# Patient Record
Sex: MALE | Race: WHITE | URBAN - METROPOLITAN AREA
[De-identification: names, ages, dates, MRNs, and addresses within clinical notes are randomized per-mention and may not be internally consistent; named-entity substitution may affect disease eponyms.]

---

## 2017-02-27 ENCOUNTER — OFFICE VISIT (OUTPATIENT)
Dept: FAMILY MEDICINE | Facility: CLINIC | Age: 27
End: 2017-02-27
Payer: COMMERCIAL

## 2017-02-27 VITALS
DIASTOLIC BLOOD PRESSURE: 94 MMHG | OXYGEN SATURATION: 95 % | HEART RATE: 112 BPM | SYSTOLIC BLOOD PRESSURE: 146 MMHG | BODY MASS INDEX: 40.61 KG/M2 | WEIGHT: 251.6 LBS | TEMPERATURE: 101.2 F

## 2017-02-27 DIAGNOSIS — J10.1 INFLUENZA A: Primary | ICD-10-CM

## 2017-02-27 LAB
DEPRECATED S PYO AG THROAT QL EIA: NORMAL
FLUAV+FLUBV AG SPEC QL: ABNORMAL
FLUAV+FLUBV AG SPEC QL: POSITIVE
MICRO REPORT STATUS: NORMAL
SPECIMEN SOURCE: ABNORMAL
SPECIMEN SOURCE: NORMAL

## 2017-02-27 PROCEDURE — 87081 CULTURE SCREEN ONLY: CPT | Performed by: PHYSICIAN ASSISTANT

## 2017-02-27 PROCEDURE — 87804 INFLUENZA ASSAY W/OPTIC: CPT | Performed by: PHYSICIAN ASSISTANT

## 2017-02-27 PROCEDURE — 99213 OFFICE O/P EST LOW 20 MIN: CPT | Performed by: PHYSICIAN ASSISTANT

## 2017-02-27 PROCEDURE — 87880 STREP A ASSAY W/OPTIC: CPT | Performed by: PHYSICIAN ASSISTANT

## 2017-02-27 RX ORDER — OSELTAMIVIR PHOSPHATE 75 MG/1
75 CAPSULE ORAL 2 TIMES DAILY
Qty: 10 CAPSULE | Refills: 0 | Status: SHIPPED | OUTPATIENT
Start: 2017-02-27 | End: 2017-03-04

## 2017-02-27 RX ORDER — DIPHENHYDRAMINE HYDROCHLORIDE 25 MG/1
CAPSULE, LIQUID FILLED ORAL
COMMUNITY
Start: 2016-09-13

## 2017-02-27 RX ORDER — PEN NEEDLE, DIABETIC 31 GX5/16"
NEEDLE, DISPOSABLE MISCELLANEOUS
Refills: 11 | COMMUNITY
Start: 2016-09-28

## 2017-02-27 NOTE — PROGRESS NOTES
SUBJECTIVE:                                                    Yogesh Nguyen is a 26 year old male who presents to clinic today for the following health issues:      Acute Illness   Acute illness concerns: fever, sore throat, cough  Onset: 1 days ago    Fever: YES    Chills/Sweats: YES    Headache (location?): no     Sinus Pressure:no    Conjunctivitis:  no    Ear Pain: no    Rhinorrhea: YES    Congestion: no     Sore Throat: YES     Cough: YES-non-productive    Wheeze: no     Decreased Appetite: no     Nausea: no     Vomiting: no     Diarrhea:  no     Dysuria/Freq.: no     Fatigue/Achiness: YES  Sick/Strep Exposure: YES, possibly   Therapies Tried and outcome: none          Problem list and histories reviewed & adjusted, as indicated.  Additional history: as documented    Patient Active Problem List   Diagnosis     Right hand pain     Past Surgical History   Procedure Laterality Date     Orthopedic surgery       plate and 7 screws in knee     Orthopedic surgery       plate and 7 screws in left wrist       Social History   Substance Use Topics     Smoking status: Never Smoker     Smokeless tobacco: Not on file     Alcohol use Yes      Comment: socially on weekends     History reviewed. No pertinent family history.      Current Outpatient Prescriptions   Medication Sig Dispense Refill     LISINOPRIL PO Take 20 mg by mouth       Blood Glucose Monitoring Suppl (BLOOD GLUCOSE MONITOR SYSTEM) W/DEVICE KIT Use as directed. Pharmacy dispense brand based on insurance.       blood glucose monitoring (ACCU-CHEK SMARTVIEW) test strip 1 strip       B-D U/F 31G X 8 MM insulin pen needle   11     insulin glargine (LANTUS) 100 UNIT/ML injection Inject 30 Units Subcutaneous       IBUPROFEN PO        dextromethorphan 15 MG/5ML syrup Take 10 mLs by mouth 4 times daily as needed for cough       oseltamivir (TAMIFLU) 75 MG capsule Take 1 capsule (75 mg) by mouth 2 times daily for 5 days 10 capsule 0     insulin lispro (HUMALOG) 100  UNIT/ML injection Inject Subcutaneous 3 times daily (before meals) Sliding scale-anywhere from 20-25 units       glucagon (GLUCAGEN HYPOKIT) 1 MG SOLR injection Inject 1 mg Subcutaneous Reported on 2/27/2017       insulin glargine U-300 (TOUJEO) 300 UNIT/ML injection Inject 32 Units Subcutaneous Reported on 2/27/2017       Problem list, Medication list, Allergies, and Medical/Social/Surgical histories reviewed in [facility] and updated as appropriate.    ROS:  Constitutional, HEENT, cardiovascular, pulmonary, gi and gu systems are negative, except as otherwise noted.    OBJECTIVE:                                                    BP (!) 146/94  Pulse 112  Temp 101.2  F (38.4  C) (Oral)  Wt 251 lb 9.6 oz (114.1 kg)  SpO2 95%  BMI 40.61 kg/m2  Body mass index is 40.61 kg/(m^2).  GENERAL: healthy, alert and no distress  EYES: Eyes grossly normal to inspection, PERRL and conjunctivae and sclerae normal  HENT: normal cephalic/atraumatic, ear canals and TM's normal, nasal mucosa edematous , rhinorrhea clear, oropharynx clear and oral mucous membranes moist  NECK: no adenopathy, no asymmetry, masses, or scars and thyroid normal to palpation  RESP: lungs clear to auscultation - no rales, rhonchi or wheezes  CV: regular rate and rhythm, normal S1 S2, no S3 or S4, no murmur, click or rub, no peripheral edema and peripheral pulses strong  ABDOMEN: soft, nontender, no hepatosplenomegaly, no masses and bowel sounds normal  MS: no gross musculoskeletal defects noted, no edema    Diagnostic Test Results:  Results for orders placed or performed in visit on 02/27/17 (from the past 24 hour(s))   Influenza A/B antigen   Result Value Ref Range    Influenza A/B Agn Specimen Nasal     Influenza A Positive (A) NEG    Influenza B  NEG     Negative   Test results must be correlated with clinical data. If necessary, results   should be confirmed by a molecular assay or viral culture.     Strep, Rapid Screen   Result Value Ref Range    Specimen  Description Throat     Rapid Strep A Screen       NEGATIVE: No Group A streptococcal antigen detected by immunoassay, await   culture report.      Micro Report Status FINAL 02/27/2017         ASSESSMENT/PLAN:                                                        ICD-10-CM    1. Fever, unspecified R50.9 Influenza A/B antigen     Strep, Rapid Screen     Beta strep group A culture   2. Influenza A J10.1 oseltamivir (TAMIFLU) 75 MG capsule     Tamiflu 75 mg , 1 capsule twice a day for 5 days after eating  Ibuprofen 600 mg every 6 hours as needed for fever  Rest  Drink a lot of water  Follow up if not better or worsening     Leigh Agee PA-C  [facility]

## 2017-02-27 NOTE — MR AVS SNAPSHOT
After Visit Summary   2/27/2017    Yogesh Nguyen    MRN: [id number]           Patient Information     Date Of Birth          1990        Visit Information        Provider Department      2/27/2017 9:20 AM Leigh Agee PA-C [facility]        Today's Diagnoses     Fever, unspecified    -  1    Influenza A          Care Instructions    Tamiflu 75 mg , 1 capsule twice a day for 5 days after eating  Ibuprofen 600 mg every 6 hours as needed for fever  Rest  Drink a lot of water    Influenza (Adult)    Influenza is also called the flu. It is a viral illness that affects the air passages of your lungs. It is different from the common cold. The flu can easily be passed from one to person to another. It may be spread through the air by coughing and sneezing. Or it can be spread by touching the sick person and then touching your own eyes, nose, or mouth.  The flu starts 1 to 3 days after you are exposed to the flu virus. It may last for 1 to 2 weeks. You usually don t need to take antibiotics unless you have a complication. This might be an ear or sinus infection or pneumonia.  Symptoms of the flu may be mild or severe. They can include extreme tiredness (wanting to stay in bed all day), chills, fevers, muscle aches, soreness with eye movement, headache, and a dry, hacking cough.  Home care  Follow these guidelines when caring for yourself at home:    Avoid being around cigarette smoke, whether yours or other people s.    Acetaminophen or ibuprofen will help ease your fever, muscle aches, and headache. Don t give aspirin to anyone younger than 18 who has the flu. Aspirin can harm the liver.    Nausea and loss of appetite are common with the flu. Eat light meals. Drink 6 to 8 glasses of liquids every day. Good choices are water, sport drinks, soft drinks without caffeine, juices, tea, and soup. Extra fluids will also help loosen secretions in your nose and  lungs.    Over-the-counter cold medicines will not make the flu go away faster. But the medicines may help with coughing, sore throat, and congestion in your nose and sinuses. Don t use a decongestant if you have high blood pressure.    Stay home until your fever has been gone for at least 24 hours without using medicine to reduce fever.  Follow-up care  Follow up with your health care provider, or as advised, if you are not getting better over the next week.  If you are 65 or older, talk with your provider about getting a pneumococcal vaccine every 5 years. You should also get this vaccine if you have chronic asthma or COPD. All adults should get a flu vaccine every fall. Ask your provider about this.  When to seek medical advice  Call your health care provider right away if any of these occur:    Cough with lots of colored sputum (mucus) or blood in your sputum    Chest pain, shortness of breath, wheezing, or difficulty breathing    Severe headache, or face, neck, or ear pain    New rash with fever    Fever of 101 F (38 C) oral or higher that doesn t get better with fever medicine    Confusion, behavior change, or seizure    Severe weakness or dizziness    You get a fever or cough after getting better for a few days       3547-0596 [employer]. [address] [place] [address]. All rights reserved. This information is not intended as a substitute for professional medical care. Always follow your healthcare professional's instructions.              Follow-ups after your visit        Who to contact     If you have questions or need follow up information about today's clinic visit or your schedule please contact [facility] directly at [phone].  Normal or non-critical lab and imaging results will be communicated to you by MyChart, letter or phone within 4 business days after the clinic has received the results. If you do not hear from us within 7 days, please contact the  "clinic through [employer] or phone. If you have a critical or abnormal lab result, we will notify you by phone as soon as possible.  Submit refill requests through [employer] or call your pharmacy and they will forward the refill request to us. Please allow 3 business days for your refill to be completed.          Additional Information About Your Visit        [employer] Information     [employer] lets you send messages to your doctor, view your test results, renew your prescriptions, schedule appointments and more. To sign up, go to [email or web address] . Click on \"Log in\" on the left side of the screen, which will take you to the Welcome page. Then click on \"Sign up Now\" on the right side of the page.     You will be asked to enter the access code listed below, as well as some personal information. Please follow the directions to create your username and password.     Your access code is: [email or web address]  Expires: 2017 10:41 AM     Your access code will  in 90 days. If you need help or a new code, please call your [facility] clinic or [phone].        Care EveryWhere ID     This is your Care EveryWhere ID. This could be used by other organizations to access your [facility] medical records  [id number]        Your Vitals Were     Pulse Temperature Pulse Oximetry BMI (Body Mass Index)          112 101.2  F (38.4  C) (Oral) 95% 40.61 kg/m2         Blood Pressure from Last 3 Encounters:   17 (!) 146/94   12 159/108   12 154/91    Weight from Last 3 Encounters:   17 251 lb 9.6 oz (114.1 kg)   12 222 lb (100.7 kg)   12 221 lb (100.2 kg)              We Performed the Following     Beta strep group A culture     Influenza A/B antigen     Strep, Rapid Screen          Today's Medication Changes          These changes are accurate as of: 17 10:41 AM.  If you have any questions, ask your nurse or doctor.               Start taking these medicines.        Dose/Directions    oseltamivir " 75 MG capsule   Commonly known as:  TAMIFLU   Used for:  Influenza A   Started by:  Leigh Agee PA-C        Dose:  75 mg   Take 1 capsule (75 mg) by mouth 2 times daily for 5 days   Quantity:  10 capsule   Refills:  0         These medicines have changed or have updated prescriptions.        Dose/Directions    * insulin glargine 100 UNIT/ML injection   Commonly known as:  LANTUS   This may have changed:  Another medication with the same name was removed. Continue taking this medication, and follow the directions you see here.   Changed by:  Leigh Agee PA-C        Dose:  30 Units   Inject 30 Units Subcutaneous   Refills:  0       * insulin glargine U-300 300 UNIT/ML injection   Commonly known as:  TOUJEO   This may have changed:  Another medication with the same name was removed. Continue taking this medication, and follow the directions you see here.   Changed by:  Leigh Agee PA-C        Dose:  32 Units   Inject 32 Units Subcutaneous Reported on 2/27/2017   Refills:  0       * Notice:  This list has 2 medication(s) that are the same as other medications prescribed for you. Read the directions carefully, and ask your doctor or other care provider to review them with you.      Stop taking these medicines if you haven't already. Please contact your care team if you have questions.     UNKNOWN TO PATIENT   Stopped by:  Leigh Agee PA-C                Where to get your medicines      These medications were sent to [facility] [address] [place] [address] [address] [facility] [address] [place] [address]     Phone:  [phone]     oseltamivir 75 MG capsule                Primary Care Provider Office Phone # Fax #    Selvin Brown [phone]       XXX NO INFO FOUND XXX XXX  XXX [place] [address]        Thank you!     Thank you for choosing [facility]  for your  care. Our goal is always to provide you with excellent care. Hearing back from our patients is one way we can continue to improve our services. Please take a few minutes to complete the written survey that you may receive in the mail after your visit with us. Thank you!             Your Updated Medication List - Protect others around you: Learn how to safely use, store and throw away your medicines at www.disposemymeds.org.          This list is accurate as of: 2/27/17 10:41 AM.  Always use your most recent med list.                   Brand Name Dispense Instructions for use    ACCU-CHEK SMARTVIEW test strip   Generic drug:  blood glucose monitoring      1 strip       B-D U/F 31G X 8 MM   Generic drug:  insulin pen needle          Blood Glucose Monitor System W/DEVICE Kit      Use as directed. Pharmacy dispense brand based on insurance.       dextromethorphan 15 MG/5ML syrup      Take 10 mLs by mouth 4 times daily as needed for cough       GLUCAGEN HYPOKIT 1 MG Solr injection   Generic drug:  glucagon      Inject 1 mg Subcutaneous Reported on 2/27/2017       humaLOG 100 UNIT/ML injection   Generic drug:  insulin lispro      Inject Subcutaneous 3 times daily (before meals) Sliding scale-anywhere from 20-25 units       IBUPROFEN PO          * insulin glargine 100 UNIT/ML injection    LANTUS     Inject 30 Units Subcutaneous       * insulin glargine U-300 300 UNIT/ML injection    TOUJEO     Inject 32 Units Subcutaneous Reported on 2/27/2017       LISINOPRIL PO      Take 20 mg by mouth       oseltamivir 75 MG capsule    TAMIFLU    10 capsule    Take 1 capsule (75 mg) by mouth 2 times daily for 5 days       * Notice:  This list has 2 medication(s) that are the same as other medications prescribed for you. Read the directions carefully, and ask your doctor or other care provider to review them with you.

## 2017-02-27 NOTE — PATIENT INSTRUCTIONS
Tamiflu 75 mg , 1 capsule twice a day for 5 days after eating  Ibuprofen 600 mg every 6 hours as needed for fever  Rest  Drink a lot of water    Influenza (Adult)    Influenza is also called the flu. It is a viral illness that affects the air passages of your lungs. It is different from the common cold. The flu can easily be passed from one to person to another. It may be spread through the air by coughing and sneezing. Or it can be spread by touching the sick person and then touching your own eyes, nose, or mouth.  The flu starts 1 to 3 days after you are exposed to the flu virus. It may last for 1 to 2 weeks. You usually don t need to take antibiotics unless you have a complication. This might be an ear or sinus infection or pneumonia.  Symptoms of the flu may be mild or severe. They can include extreme tiredness (wanting to stay in bed all day), chills, fevers, muscle aches, soreness with eye movement, headache, and a dry, hacking cough.  Home care  Follow these guidelines when caring for yourself at home:    Avoid being around cigarette smoke, whether yours or other people s.    Acetaminophen or ibuprofen will help ease your fever, muscle aches, and headache. Don t give aspirin to anyone younger than 18 who has the flu. Aspirin can harm the liver.    Nausea and loss of appetite are common with the flu. Eat light meals. Drink 6 to 8 glasses of liquids every day. Good choices are water, sport drinks, soft drinks without caffeine, juices, tea, and soup. Extra fluids will also help loosen secretions in your nose and lungs.    Over-the-counter cold medicines will not make the flu go away faster. But the medicines may help with coughing, sore throat, and congestion in your nose and sinuses. Don t use a decongestant if you have high blood pressure.    Stay home until your fever has been gone for at least 24 hours without using medicine to reduce fever.  Follow-up care  Follow up with your health care provider, or as  advised, if you are not getting better over the next week.  If you are 65 or older, talk with your provider about getting a pneumococcal vaccine every 5 years. You should also get this vaccine if you have chronic asthma or COPD. All adults should get a flu vaccine every fall. Ask your provider about this.  When to seek medical advice  Call your health care provider right away if any of these occur:    Cough with lots of colored sputum (mucus) or blood in your sputum    Chest pain, shortness of breath, wheezing, or difficulty breathing    Severe headache, or face, neck, or ear pain    New rash with fever    Fever of 101 F (38 C) oral or higher that doesn t get better with fever medicine    Confusion, behavior change, or seizure    Severe weakness or dizziness    You get a fever or cough after getting better for a few days       2638-1820 [employer]. [address] [place] [address], [place] [address]. All rights reserved. This information is not intended as a substitute for professional medical care. Always follow your healthcare professional's instructions.

## 2017-03-01 LAB
BACTERIA SPEC CULT: NORMAL
MICRO REPORT STATUS: NORMAL
SPECIMEN SOURCE: NORMAL

## 2017-11-10 ENCOUNTER — OFFICE VISIT (OUTPATIENT)
Dept: FAMILY MEDICINE | Facility: CLINIC | Age: 27
End: 2017-11-10
Payer: COMMERCIAL

## 2017-11-10 VITALS
SYSTOLIC BLOOD PRESSURE: 136 MMHG | RESPIRATION RATE: 16 BRPM | TEMPERATURE: 97 F | DIASTOLIC BLOOD PRESSURE: 84 MMHG | HEART RATE: 87 BPM | WEIGHT: 252 LBS | OXYGEN SATURATION: 100 % | BODY MASS INDEX: 40.67 KG/M2

## 2017-11-10 DIAGNOSIS — Z23 NEED FOR PROPHYLACTIC VACCINATION AND INOCULATION AGAINST INFLUENZA: ICD-10-CM

## 2017-11-10 DIAGNOSIS — H57.89 REDNESS OF EYE, LEFT: Primary | ICD-10-CM

## 2017-11-10 PROCEDURE — 90686 IIV4 VACC NO PRSV 0.5 ML IM: CPT | Performed by: NURSE PRACTITIONER

## 2017-11-10 PROCEDURE — 90471 IMMUNIZATION ADMIN: CPT | Performed by: NURSE PRACTITIONER

## 2017-11-10 PROCEDURE — 99213 OFFICE O/P EST LOW 20 MIN: CPT | Mod: 25 | Performed by: NURSE PRACTITIONER

## 2017-11-10 RX ORDER — POLYMYXIN B SULFATE AND TRIMETHOPRIM 1; 10000 MG/ML; [USP'U]/ML
1 SOLUTION OPHTHALMIC
Qty: 1 BOTTLE | Refills: 0 | Status: SHIPPED | OUTPATIENT
Start: 2017-11-10 | End: 2017-11-17

## 2017-11-10 NOTE — MR AVS SNAPSHOT
After Visit Summary   11/10/2017    Yogesh Nguyen    MRN: [id number]           Patient Information     Date Of Birth          1990        Visit Information        Provider Department      11/10/2017 8:00 AM Chelita Hernandez APRN [facility]        Today's Diagnoses     Redness of eye, left    -  1      Care Instructions    You can use Liquid Tears over the counter if you need to for pain in the eye. Start the antibiotic drops today. If not better by Monday, see the eye doctor.    [facility]    If you have any questions regarding to your visit please contact your care team:       Team Red:   Clinic Hours Telephone Number   Dr. Yecenia Cook  (pediatrics)  Chelita Hernandez NP 7am-7pm  Monday - Thursday   7am-5pm  Fridays  [phone] (fax)    Viky ORTIZ  [phone]   Urgent Care - [place] and [place] Monday-Friday  [place] - 11am-8pm  Saturday-Sunday  Both sites - 9am-5pm  [phone] - [facility]  [phone] - [place]       What options do I have for visits at the clinic other than the traditional office visit?  To expand how we care for you, many of our providers are utilizing electronic visits (e-visits) and telephone visits, when medically appropriate, for interactions with their patients rather than a visit in the clinic.   We also offer nurse visits for many medical concerns. Just like any other service, we will bill your insurance company for this type of visit based on time spent on the phone with your provider. Not all insurance companies cover these visits. Please check with your medical insurance if this type of visit is covered. You will be responsible for any charges that are not paid by your insurance.      E-visits via Yakaz:  generally incur a $35.00 fee.  Telephone visits:  Time spent on the phone: *charged based on time that is spent on the phone in increments of 10  "minutes. Estimated cost:   5-10 mins $30.00   11-20 mins. $59.00   21-30 mins. $85.00     As always, Thank you for trusting us with your health care needs!                      Follow-ups after your visit        Who to contact     If you have questions or need follow up information about today's clinic visit or your schedule please contact [facility] directly at [phone].  Normal or non-critical lab and imaging results will be communicated to you by Adventihart, letter or phone within 4 business days after the clinic has received the results. If you do not hear from us within 7 days, please contact the clinic through Accruentt or phone. If you have a critical or abnormal lab result, we will notify you by phone as soon as possible.  Submit refill requests through Etogas or call your pharmacy and they will forward the refill request to us. Please allow 3 business days for your refill to be completed.          Additional Information About Your Visit        Etogas Information     Etogas lets you send messages to your doctor, view your test results, renew your prescriptions, schedule appointments and more. To sign up, go to [email or web address] . Click on \"Log in\" on the left side of the screen, which will take you to the Welcome page. Then click on \"Sign up Now\" on the right side of the page.     You will be asked to enter the access code listed below, as well as some personal information. Please follow the directions to create your username and password.     Your access code is: [email or web address]  Expires: 2018  8:31 AM     Your access code will  in 90 days. If you need help or a new code, please call your [facility] clinic or [phone].        Care EveryWhere ID     This is your Care EveryWhere ID. This could be used by other organizations to access your [facility] medical records  [id number]        Your Vitals Were     Pulse Temperature Respirations Pulse Oximetry BMI (Body Mass Index)       " 87 97  F (36.1  C) 16 100% 40.67 kg/m2        Blood Pressure from Last 3 Encounters:   02/27/17 (!) 146/94   09/23/12 159/108   05/28/12 154/91    Weight from Last 3 Encounters:   11/10/17 252 lb (114.3 kg)   02/27/17 251 lb 9.6 oz (114.1 kg)   09/23/12 222 lb (100.7 kg)              Today, you had the following     No orders found for display         Today's Medication Changes          These changes are accurate as of: 11/10/17  8:31 AM.  If you have any questions, ask your nurse or doctor.               Start taking these medicines.        Dose/Directions    trimethoprim-polymyxin b ophthalmic solution   Commonly known as:  POLYTRIM   Used for:  Redness of eye, left   Started by:  Chelita Hernandez APRN CNP        Dose:  1 drop   Apply 1 drop to eye every 3 hours for 7 days   Quantity:  1 Bottle   Refills:  0            Where to get your medicines      These medications were sent to [facility] [place], [place] - [address] [place] [address]  [address] [facility] [address], [facility] [address]     Phone:  [phone]     trimethoprim-polymyxin b ophthalmic solution                Primary Care Provider Office Phone # Fax #    Selvin Brown [phone]       XXX NO INFO FOUND XXX XXX  XXX [place] [address]        Equal Access to Services     UNRULY BROWN AH: Hadii noy ku hadasho Soomaali, waaxda luqadaha, qaybta kaalmada adeegyada, timoteo oh . So [facility] [phone].    ATENCIÓN: Si habla español, tiene a barone disposición servicios gratuitos de asistencia lingüística. Fredy al [phone].    We comply with applicable federal civil rights laws and [place] laws. We do not discriminate on the basis of race, color, national origin, age, disability, sex, sexual orientation, or gender identity.            Thank you!     Thank you for choosing [facility]  for your care. Our goal is always to provide you with excellent care. Hearing back from our patients is  one way we can continue to improve our services. Please take a few minutes to complete the written survey that you may receive in the mail after your visit with us. Thank you!             Your Updated Medication List - Protect others around you: Learn how to safely use, store and throw away your medicines at www.disposemymeds.org.          This list is accurate as of: 11/10/17  8:31 AM.  Always use your most recent med list.                   Brand Name Dispense Instructions for use Diagnosis    ACCU-CHEK SMARTVIEW test strip   Generic drug:  blood glucose monitoring      1 strip        B-D U/F 31G X 8 MM   Generic drug:  insulin pen needle           Blood Glucose Monitor System W/DEVICE Kit      Use as directed. Pharmacy dispense brand based on insurance.        GLUCAGEN HYPOKIT 1 MG Solr injection   Generic drug:  glucagon      Inject 1 mg Subcutaneous Reported on 2/27/2017        humaLOG 100 UNIT/ML injection   Generic drug:  insulin lispro      Inject Subcutaneous 3 times daily (before meals) Sliding scale-anywhere from 20-25 units    Acute pharyngitis       IBUPROFEN PO           insulin glargine U-300 300 UNIT/ML injection    TOUJEO     Inject 32 Units Subcutaneous Reported on 2/27/2017        LISINOPRIL PO      Take 20 mg by mouth        trimethoprim-polymyxin b ophthalmic solution    POLYTRIM    1 Bottle    Apply 1 drop to eye every 3 hours for 7 days    Redness of eye, left

## 2017-11-10 NOTE — PROGRESS NOTES
SUBJECTIVE:   Yogesh Nguyen is a 27 year old male who presents to clinic today for the following health issues:      Eye(s) Problem  Onset: today    Description:   Location: left  Pain: YES  Redness: YES    Accompanying Signs & Symptoms:  Discharge/mattering: no  Swelling: no  Visual changes: no  Fever: no  Nasal Congestion: yes  Bothered by bright lights: no     History:   Trauma: no   Foreign body exposure: YES    Precipitating factors:   Wearing contacts: no     Alleviating factors:  Improved by:     Therapies Tried and outcome:     Left eye red upon awakening. Very painful, eye watering. 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      Problem list and histories reviewed & adjusted, as indicated.  Additional history: as documented    Patient Active Problem List   Diagnosis     Right hand pain     Past Surgical History:   Procedure Laterality Date     ORTHOPEDIC SURGERY      plate and 7 screws in knee     ORTHOPEDIC SURGERY      plate and 7 screws in left wrist       Social History   Substance Use Topics     Smoking status: Never Smoker     Smokeless tobacco: Never Used     Alcohol use Yes      Comment: socially on weekends     History reviewed. No pertinent family history.          Reviewed and updated as needed this visit by clinical staffTobacco       Reviewed and updated as needed this visit by Provider         ROS:  Constitutional, HEENT Systems are negative, except as otherwise noted.      OBJECTIVE:   /84  Pulse 87  Temp 97  F (36.1  C)  Resp 16  Wt 252 lb (114.3 kg)  SpO2 100%  BMI 40.67 kg/m2  Body mass index is 40.67 kg/(m^2).  GENERAL: healthy, alert and no distress  EYES: PERRL, EOMI, eyelids- NORMAL, conjunctiva/corneas- conjunctival injection OS and watery discharge and 2 drops Fluorescein-Benoxinate applied to left eye. No corneal defect by Wood's lamp exam.      Diagnostic Test Results:  none     ASSESSMENT/PLAN:       1. Redness of eye,  left  No FB or corneal abrasion, will cover with Polytrim for prophylaxis and may use liquid tears for lubrication. Follow up with ophthalmology if not improved over the weekend.  - trimethoprim-polymyxin b (POLYTRIM) ophthalmic solution; Apply 1 drop to eye every 3 hours for 7 days  Dispense: 1 Bottle; Refill: 0    2. Need for prophylactic vaccination and inoculation against influenza    - FLU VAC, SPLIT VIRUS IM > 3 YO (QUADRIVALENT) [98539]  - Vaccine Administration, Initial [99745]    Follow up as needed    JOSE ANGEL Salcedo [facility]

## 2017-11-10 NOTE — PATIENT INSTRUCTIONS
You can use Liquid Tears over the counter if you need to for pain in the eye. Start the antibiotic drops today. If not better by Monday, see the eye doctor.    [facility]    If you have any questions regarding to your visit please contact your care team:       Team Red:   Clinic Hours Telephone Number   Dr. Yecenia Cook  (pediatrics)  Chelita Hernandez NP 7am-7pm  Monday - Thursday   7am-5pm  Fridays  [phone] (fax)    Viky ORTIZ  [phone]   Urgent Care - [place] and [place] Monday-Friday  [place] - 11am-8pm  Saturday-Sunday  Both sites - 9am-5pm  [phone] - [facility]  [phone] - [place]       What options do I have for visits at the clinic other than the traditional office visit?  To expand how we care for you, many of our providers are utilizing electronic visits (e-visits) and telephone visits, when medically appropriate, for interactions with their patients rather than a visit in the clinic.   We also offer nurse visits for many medical concerns. Just like any other service, we will bill your insurance company for this type of visit based on time spent on the phone with your provider. Not all insurance companies cover these visits. Please check with your medical insurance if this type of visit is covered. You will be responsible for any charges that are not paid by your insurance.      E-visits via XM Radio:  generally incur a $35.00 fee.  Telephone visits:  Time spent on the phone: *charged based on time that is spent on the phone in increments of 10 minutes. Estimated cost:   5-10 mins $30.00   11-20 mins. $59.00   21-30 mins. $85.00     As always, Thank you for trusting us with your health care needs!

## 2017-11-10 NOTE — Clinical Note
Haroon rico- can you remind if I need to bill for the wood's lamp exam? Or is this included in the LOS?

## 2017-11-10 NOTE — PROGRESS NOTES

## 2017-11-10 NOTE — NURSING NOTE
"Chief Complaint   Patient presents with     Foreign Body in Eye     left eye     Flu Shot       Initial /84  Pulse 87  Temp 97  F (36.1  C)  Resp 16  Wt 252 lb (114.3 kg)  SpO2 100%  BMI 40.67 kg/m2 Estimated body mass index is 40.67 kg/(m^2) as calculated from the following:    Height as of 9/23/12: 5' 6\" (1.676 m).    Weight as of this encounter: 252 lb (114.3 kg).  Medication Reconciliation: complete       Marcella Ac. MA      "